# Patient Record
Sex: MALE | Race: BLACK OR AFRICAN AMERICAN | NOT HISPANIC OR LATINO | Employment: OTHER | URBAN - METROPOLITAN AREA
[De-identification: names, ages, dates, MRNs, and addresses within clinical notes are randomized per-mention and may not be internally consistent; named-entity substitution may affect disease eponyms.]

---

## 2018-04-09 ENCOUNTER — OFFICE VISIT (OUTPATIENT)
Dept: CARDIOLOGY | Facility: CLINIC | Age: 83
End: 2018-04-09
Attending: INTERNAL MEDICINE
Payer: MEDICARE

## 2018-04-09 VITALS
SYSTOLIC BLOOD PRESSURE: 132 MMHG | HEIGHT: 69 IN | DIASTOLIC BLOOD PRESSURE: 62 MMHG | HEART RATE: 66 BPM | BODY MASS INDEX: 25.48 KG/M2 | WEIGHT: 172 LBS

## 2018-04-09 DIAGNOSIS — I35.0 NONRHEUMATIC AORTIC VALVE STENOSIS: ICD-10-CM

## 2018-04-09 DIAGNOSIS — G47.33 OSA (OBSTRUCTIVE SLEEP APNEA): ICD-10-CM

## 2018-04-09 DIAGNOSIS — I25.10 CORONARY ARTERY DISEASE INVOLVING NATIVE CORONARY ARTERY OF NATIVE HEART WITHOUT ANGINA PECTORIS: ICD-10-CM

## 2018-04-09 DIAGNOSIS — E08.9 DIABETES MELLITUS DUE TO UNDERLYING CONDITION WITHOUT COMPLICATION, WITH LONG-TERM CURRENT USE OF INSULIN: ICD-10-CM

## 2018-04-09 DIAGNOSIS — I63.9 CEREBROVASCULAR ACCIDENT (CVA), UNSPECIFIED MECHANISM: Primary | ICD-10-CM

## 2018-04-09 DIAGNOSIS — Z79.4 DIABETES MELLITUS DUE TO UNDERLYING CONDITION WITHOUT COMPLICATION, WITH LONG-TERM CURRENT USE OF INSULIN: ICD-10-CM

## 2018-04-09 DIAGNOSIS — I10 ESSENTIAL HYPERTENSION: ICD-10-CM

## 2018-04-09 PROCEDURE — 99213 OFFICE O/P EST LOW 20 MIN: CPT | Mod: S$GLB,,, | Performed by: INTERNAL MEDICINE

## 2018-04-10 NOTE — PROGRESS NOTES
"Subjective:    Patient ID:  Clint Dukes is a 87 y.o. male     HPI  Here for F/U of CAD, CVA, AS, HBP, YAAKOV    Wife says that they went to the ER with a confusional state and were told that the Ammonia level was high and he was placed on Lactulose. He is now doing fairly well. Wheelchair bound, he sleeps a lot.    Current Outpatient Prescriptions   Medication Sig    ACCU-CHEK EFRAÍN PLUS TEST STRP Strp     aspirin (BUFFERIN) 325 MG Tab Take 325 mg by mouth.    aspirin 325 MG tablet Take 325 mg by mouth once daily.    atorvastatin (LIPITOR) 40 MG tablet Take 40 mg by mouth.    BD INSULIN PEN NEEDLE UF MINI 31 x 3/16 " Ndle     bisacodyl (DULCOLAX) 5 mg EC tablet Take 5 mg by mouth.    carvedilol (COREG) 6.25 MG tablet Take 6.25 mg by mouth 2 (two) times daily with meals.    ceramides 1,3,6-11 (CERAVE) Crea Apply topically.    clopidogrel (PLAVIX) 75 mg tablet Take 75 mg by mouth once daily.    dextromethorphan-guaifenesin  mg (MUCINEX DM)  mg per 12 hr tablet Take 1 tablet by mouth every 12 (twelve) hours.    diclofenac sodium 1 % Gel Apply 2 g topically once daily.    dorzolamide-timolol 2-0.5% (COSOPT) 2-0.5 % ophthalmic solution 1 drop 2 (two) times daily.    ergocalciferol (VITAMIN D2) 50,000 unit Cap Take 50,000 Units by mouth every 7 days.    insulin aspart (NOVOLOG) 100 unit/mL injection Inject 100 Units into the skin 3 (three) times daily before meals.    lactulose (CHRONULAC) 10 gram/15 mL solution Take 30 g by mouth 2 (two) times daily.    latanoprost 0.005 % ophthalmic solution 1 drop every evening.    metformin (GLUCOPHAGE) 500 MG tablet Take 500 mg by mouth 2 (two) times daily with meals.    multivitamin capsule Take 1 capsule by mouth once daily.    nifedipine (ADALAT CC) 30 MG TbSR Take 30 mg by mouth once daily.    omeprazole (PRILOSEC) 40 MG capsule Take 40 mg by mouth once daily.    PROPYLENE GLYCOL//PF (SYSTANE, PF, OPHT) Apply to eye.    rivastigmine " "tartrate (EXELON) 6 mg capsule Take 6 mg by mouth 2 (two) times daily.    theophylline (OBED-24) 100 MG 24 hr capsule Take 100 mg by mouth once daily.    timolol 0.25 % ophthalmic solution 1-2 drops.     No current facility-administered medications for this visit.          Review of Systems   Constitution: Negative for chills, decreased appetite, fever, weight gain and weight loss.   HENT: Negative for congestion, hearing loss and sore throat.    Eyes: Negative for blurred vision, double vision and visual disturbance.   Cardiovascular: Negative for chest pain, claudication, dyspnea on exertion, leg swelling, palpitations and syncope.   Respiratory: Negative for cough, hemoptysis, shortness of breath, sputum production and wheezing.    Endocrine: Negative for cold intolerance and heat intolerance.   Hematologic/Lymphatic: Negative for bleeding problem. Does not bruise/bleed easily.   Skin: Negative for color change, dry skin, flushing and itching.   Musculoskeletal: Negative for back pain, joint pain and myalgias.   Gastrointestinal: Negative for abdominal pain, anorexia, constipation, diarrhea, dysphagia, nausea and vomiting.        No bleeding per rectum   Genitourinary: Negative for dysuria, flank pain, frequency, hematuria and nocturia.   Neurological: Positive for difficulty with concentration and excessive daytime sleepiness. Negative for dizziness, headaches, light-headedness, loss of balance, seizures and tremors.   Psychiatric/Behavioral: Negative for altered mental status and depression.         Vitals:    04/09/18 1044   BP: 132/62   Pulse: 66   Weight: 78 kg (172 lb)   Height: 5' 9" (1.753 m)     Objective:    Physical Exam   Constitutional: He is oriented to person, place, and time. He appears well-developed and well-nourished.   HENT:   Head: Normocephalic and atraumatic.   Right Ear: External ear normal.   Left Ear: External ear normal.   Nose: Nose normal.   Eyes: Conjunctivae and EOM are normal. " Pupils are equal, round, and reactive to light. No scleral icterus.   Neck: Normal range of motion. Neck supple. No JVD present. No tracheal deviation present. No thyromegaly present.   Cardiovascular: Normal rate and regular rhythm.  Exam reveals no gallop and no friction rub.    Murmur heard.  Basal ejection systolic murmur conducted to the neck.   Pulmonary/Chest: Effort normal and breath sounds normal. No respiratory distress. He has no rales. He exhibits no tenderness.   Abdominal: Soft. Bowel sounds are normal. He exhibits no distension and no mass. There is no tenderness.   Musculoskeletal: Normal range of motion. He exhibits no edema or tenderness.   Lymphadenopathy:     He has no cervical adenopathy.   Neurological: He is alert and oriented to person, place, and time. He has normal reflexes. No cranial nerve deficit. Coordination normal.   Skin: Skin is warm and dry. No rash noted.   Psychiatric: He has a normal mood and affect. His behavior is normal.         Assessment:       1. Cerebrovascular accident (CVA), unspecified mechanism    2. Coronary artery disease involving native coronary artery of native heart without angina pectoris    3. Diabetes mellitus due to underlying condition without complication, with long-term current use of insulin    4. Nonrheumatic aortic valve stenosis    5. Essential hypertension    6. YAAKOV (obstructive sleep apnea)         Plan:       Cardiac wise stable  Continue the same

## 2018-08-27 ENCOUNTER — OFFICE VISIT (OUTPATIENT)
Dept: CARDIOLOGY | Facility: CLINIC | Age: 83
End: 2018-08-27
Attending: INTERNAL MEDICINE
Payer: MEDICARE

## 2018-08-27 VITALS
BODY MASS INDEX: 25.18 KG/M2 | HEART RATE: 63 BPM | WEIGHT: 170 LBS | SYSTOLIC BLOOD PRESSURE: 143 MMHG | HEIGHT: 69 IN | DIASTOLIC BLOOD PRESSURE: 62 MMHG

## 2018-08-27 DIAGNOSIS — I35.0 AORTIC VALVE STENOSIS, ETIOLOGY OF CARDIAC VALVE DISEASE UNSPECIFIED: Primary | ICD-10-CM

## 2018-08-27 DIAGNOSIS — E78.5 HYPERLIPIDEMIA, UNSPECIFIED HYPERLIPIDEMIA TYPE: ICD-10-CM

## 2018-08-27 DIAGNOSIS — I63.9 CEREBROVASCULAR ACCIDENT (CVA), UNSPECIFIED MECHANISM: ICD-10-CM

## 2018-08-27 DIAGNOSIS — E08.49 DIABETES MELLITUS DUE TO UNDERLYING CONDITION WITH OTHER NEUROLOGIC COMPLICATION, WITH LONG-TERM CURRENT USE OF INSULIN: ICD-10-CM

## 2018-08-27 DIAGNOSIS — I10 ESSENTIAL HYPERTENSION: ICD-10-CM

## 2018-08-27 DIAGNOSIS — I35.0 NONRHEUMATIC AORTIC VALVE STENOSIS: ICD-10-CM

## 2018-08-27 DIAGNOSIS — Z79.4 DIABETES MELLITUS DUE TO UNDERLYING CONDITION WITH OTHER NEUROLOGIC COMPLICATION, WITH LONG-TERM CURRENT USE OF INSULIN: ICD-10-CM

## 2018-08-27 DIAGNOSIS — E66.9 OBESITY WITH SERIOUS COMORBIDITY, UNSPECIFIED CLASSIFICATION, UNSPECIFIED OBESITY TYPE: ICD-10-CM

## 2018-08-27 DIAGNOSIS — I25.10 CORONARY ARTERY DISEASE INVOLVING NATIVE CORONARY ARTERY OF NATIVE HEART WITHOUT ANGINA PECTORIS: ICD-10-CM

## 2018-08-27 DIAGNOSIS — G47.33 OSA (OBSTRUCTIVE SLEEP APNEA): ICD-10-CM

## 2018-08-27 PROCEDURE — 99214 OFFICE O/P EST MOD 30 MIN: CPT | Mod: S$GLB,,, | Performed by: INTERNAL MEDICINE

## 2018-08-27 PROCEDURE — 93000 ELECTROCARDIOGRAM COMPLETE: CPT | Mod: S$GLB,,, | Performed by: INTERNAL MEDICINE

## 2018-08-27 NOTE — PROGRESS NOTES
"Subjective:    Patient ID:  Clint Dukes is a 87 y.o. male     HPI   Here for follow-up of cerebrovascular accident, coronary artery disease, aortic stenosis, hypertension, hyperlipidemia, obesity, diabetes mellitus, obstructive sleep apnea.    I am doing well.  I walk limited distances with my walker.  I want to know if I can discontinue my oxygen in the daytime.  I continue to use the CPAP at night.    Current Outpatient Medications   Medication Sig    ACCU-CHEK EFRAÍN PLUS TEST STRP Strp     aspirin (BUFFERIN) 325 MG Tab Take 325 mg by mouth.    aspirin 325 MG tablet Take 325 mg by mouth once daily.    atorvastatin (LIPITOR) 40 MG tablet Take 40 mg by mouth.    BD INSULIN PEN NEEDLE UF MINI 31 x 3/16 " Ndle     bisacodyl (DULCOLAX) 5 mg EC tablet Take 5 mg by mouth.    carvedilol (COREG) 6.25 MG tablet Take 6.25 mg by mouth 2 (two) times daily with meals.    ceramides 1,3,6-11 (CERAVE) Crea Apply topically.    clopidogrel (PLAVIX) 75 mg tablet Take 75 mg by mouth once daily.    dextromethorphan-guaifenesin  mg (MUCINEX DM)  mg per 12 hr tablet Take 1 tablet by mouth every 12 (twelve) hours.    diclofenac sodium 1 % Gel Apply 2 g topically once daily.    dorzolamide-timolol 2-0.5% (COSOPT) 2-0.5 % ophthalmic solution 1 drop 2 (two) times daily.    ergocalciferol (VITAMIN D2) 50,000 unit Cap Take 50,000 Units by mouth every 7 days.    insulin aspart (NOVOLOG) 100 unit/mL injection Inject 100 Units into the skin 3 (three) times daily before meals.    lactulose (CHRONULAC) 10 gram/15 mL solution Take 30 g by mouth 2 (two) times daily.    latanoprost 0.005 % ophthalmic solution 1 drop every evening.    metformin (GLUCOPHAGE) 500 MG tablet Take 500 mg by mouth 2 (two) times daily with meals.    multivitamin capsule Take 1 capsule by mouth once daily.    nifedipine (ADALAT CC) 30 MG TbSR Take 30 mg by mouth once daily.    omeprazole (PRILOSEC) 40 MG capsule Take 40 mg by mouth once daily. " "   PROPYLENE GLYCOL//PF (SYSTANE, PF, OPHT) Apply to eye.    rivastigmine tartrate (EXELON) 6 mg capsule Take 6 mg by mouth 2 (two) times daily.    theophylline (OBED-24) 100 MG 24 hr capsule Take 100 mg by mouth once daily.    timolol 0.25 % ophthalmic solution 1-2 drops.     No current facility-administered medications for this visit.          Review of Systems   Constitution: Negative for chills, decreased appetite, fever, weight gain and weight loss.   HENT: Negative for congestion, hearing loss and sore throat.    Eyes: Negative for blurred vision, double vision and visual disturbance.   Cardiovascular: Negative for chest pain, claudication, dyspnea on exertion, leg swelling, palpitations and syncope.   Respiratory: Negative for cough, hemoptysis, shortness of breath, sputum production and wheezing.    Endocrine: Negative for cold intolerance and heat intolerance.   Hematologic/Lymphatic: Negative for bleeding problem. Does not bruise/bleed easily.   Skin: Negative for color change, dry skin, flushing and itching.   Musculoskeletal: Negative for back pain, joint pain and myalgias.   Gastrointestinal: Negative for abdominal pain, anorexia, constipation, diarrhea, dysphagia, nausea and vomiting.        No bleeding per rectum   Genitourinary: Negative for dysuria, flank pain, frequency, hematuria and nocturia.   Neurological: Negative for dizziness, headaches, light-headedness, loss of balance, seizures and tremors.   Psychiatric/Behavioral: Negative for altered mental status and depression.         Vitals:    08/27/18 1317   BP: (!) 143/62   Pulse: 63   Weight: 77.1 kg (170 lb)   Height: 5' 9" (1.753 m)     Objective:    Physical Exam   Constitutional: He is oriented to person, place, and time. He appears well-developed and well-nourished.   HENT:   Head: Normocephalic and atraumatic.   Right Ear: External ear normal.   Left Ear: External ear normal.   Nose: Nose normal.   Eyes: Conjunctivae and EOM " are normal. Pupils are equal, round, and reactive to light. No scleral icterus.   Neck: Normal range of motion. Neck supple. No JVD present. No tracheal deviation present. No thyromegaly present.   Cardiovascular: Normal rate and regular rhythm. Exam reveals no gallop and no friction rub.   Murmur heard.  Basal ejection systolic murmur 3/6 conducted to the carotids.   Pulmonary/Chest: Effort normal and breath sounds normal. No respiratory distress. He has no rales. He exhibits no tenderness.   Abdominal: Soft. Bowel sounds are normal. He exhibits no distension and no mass. There is no tenderness.   Musculoskeletal: Normal range of motion. He exhibits no edema or tenderness.   Lymphadenopathy:     He has no cervical adenopathy.   Neurological: He is alert and oriented to person, place, and time. He has normal reflexes. No cranial nerve deficit. Coordination normal.   Skin: Skin is warm and dry. No rash noted.   Psychiatric: He has a normal mood and affect. His behavior is normal.         Assessment:           2. Cerebrovascular accident (CVA), unspecified mechanism    3. Coronary artery disease involving native coronary artery of native heart without angina pectoris    4. Nonrheumatic aortic valve stenosis    5. Essential hypertension    6. Hyperlipidemia, unspecified hyperlipidemia type    7. Obesity with serious comorbidity, unspecified classification, unspecified obesity type    8. 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 YAAKOV (obstructive sleep apnea)         Plan:       Wife to check pulse oximetry off the oxygen in the daytime.  Continue with CPAP at night.  Echocardiogram at next office visit.

## 2018-11-07 ENCOUNTER — OFFICE VISIT (OUTPATIENT)
Dept: CARDIOLOGY | Facility: CLINIC | Age: 83
End: 2018-11-07
Attending: INTERNAL MEDICINE
Payer: MEDICARE

## 2018-11-07 VITALS
HEART RATE: 72 BPM | DIASTOLIC BLOOD PRESSURE: 71 MMHG | WEIGHT: 170 LBS | HEIGHT: 69 IN | SYSTOLIC BLOOD PRESSURE: 166 MMHG | BODY MASS INDEX: 25.18 KG/M2

## 2018-11-07 DIAGNOSIS — I10 ESSENTIAL HYPERTENSION: ICD-10-CM

## 2018-11-07 DIAGNOSIS — E66.9 OBESITY WITH SERIOUS COMORBIDITY, UNSPECIFIED CLASSIFICATION, UNSPECIFIED OBESITY TYPE: ICD-10-CM

## 2018-11-07 DIAGNOSIS — E08.49 DIABETES MELLITUS DUE TO UNDERLYING CONDITION WITH OTHER NEUROLOGIC COMPLICATION, WITH LONG-TERM CURRENT USE OF INSULIN: ICD-10-CM

## 2018-11-07 DIAGNOSIS — I35.0 AORTIC VALVE STENOSIS, ETIOLOGY OF CARDIAC VALVE DISEASE UNSPECIFIED: Primary | ICD-10-CM

## 2018-11-07 DIAGNOSIS — I63.9 CEREBROVASCULAR ACCIDENT (CVA), UNSPECIFIED MECHANISM: ICD-10-CM

## 2018-11-07 DIAGNOSIS — E78.5 HYPERLIPIDEMIA, UNSPECIFIED HYPERLIPIDEMIA TYPE: ICD-10-CM

## 2018-11-07 DIAGNOSIS — I25.10 CORONARY ARTERY DISEASE INVOLVING NATIVE CORONARY ARTERY OF NATIVE HEART WITHOUT ANGINA PECTORIS: ICD-10-CM

## 2018-11-07 DIAGNOSIS — G47.33 OSA (OBSTRUCTIVE SLEEP APNEA): ICD-10-CM

## 2018-11-07 DIAGNOSIS — I35.0 NONRHEUMATIC AORTIC VALVE STENOSIS: ICD-10-CM

## 2018-11-07 DIAGNOSIS — Z79.4 DIABETES MELLITUS DUE TO UNDERLYING CONDITION WITH OTHER NEUROLOGIC COMPLICATION, WITH LONG-TERM CURRENT USE OF INSULIN: ICD-10-CM

## 2018-11-07 PROCEDURE — 99213 OFFICE O/P EST LOW 20 MIN: CPT | Mod: S$GLB,,, | Performed by: INTERNAL MEDICINE

## 2018-11-07 NOTE — PROGRESS NOTES
"Subjective:    Patient ID:  Clint Dukes is a 88 y.o. male     HPI   Here for follow-up of coronary artery disease, status post drug-eluting stent in the right coronary artery, and 2 stents in the left circumflex coronary artery in 2010, drug-eluting stent in the left anterior descending coronary artery in 2012, aortic stenosis, essential hypertension, diabetes mellitus, obesity, obstructive sleep apnea, cerebrovascular accident, hyperlipidemia.    Wife says that he was recently hospitalized at [facility] for urosepsis.  He was sent home on of antibiotics and is now doing quite well.  He still sleeps a lot, however uses his CPAP religiously at night.  He denies a history of breathlessness.  During his hospitalization at [facility], he apparently had an echocardiogram, and was told that his aortic valve stenosis had become a little worse.    Current Outpatient Medications   Medication Sig    ACCU-CHEK EFRAÍN PLUS TEST STRP Strp     aspirin (BUFFERIN) 325 MG Tab Take 325 mg by mouth.    aspirin 325 MG tablet Take 325 mg by mouth once daily.    atorvastatin (LIPITOR) 40 MG tablet Take 40 mg by mouth.    BD INSULIN PEN NEEDLE UF MINI 31 x 3/16 " Ndle     bisacodyl (DULCOLAX) 5 mg EC tablet Take 5 mg by mouth.    carvedilol (COREG) 6.25 MG tablet Take 6.25 mg by mouth 2 (two) times daily with meals.    ceramides 1,3,6-11 (CERAVE) Crea Apply topically.    clopidogrel (PLAVIX) 75 mg tablet Take 75 mg by mouth once daily.    dextromethorphan-guaifenesin  mg (MUCINEX DM)  mg per 12 hr tablet Take 1 tablet by mouth every 12 (twelve) hours.    diclofenac sodium 1 % Gel Apply 2 g topically once daily.    dorzolamide-timolol 2-0.5% (COSOPT) 2-0.5 % ophthalmic solution 1 drop 2 (two) times daily.    ergocalciferol (VITAMIN D2) 50,000 unit Cap Take 50,000 Units by mouth every 7 days.    insulin aspart (NOVOLOG) 100 unit/mL injection Inject 100 Units into the skin 3 (three) times daily before meals.    lactulose " (CHRONULAC) 10 gram/15 mL solution Take 30 g by mouth 2 (two) times daily.    latanoprost 0.005 % ophthalmic solution 1 drop every evening.    metformin (GLUCOPHAGE) 500 MG tablet Take 500 mg by mouth 2 (two) times daily with meals.    multivitamin capsule Take 1 capsule by mouth once daily.    nifedipine (ADALAT CC) 30 MG TbSR Take 30 mg by mouth once daily.    omeprazole (PRILOSEC) 40 MG capsule Take 40 mg by mouth once daily.    PROPYLENE GLYCOL//PF (SYSTANE, PF, OPHT) Apply to eye.    rivastigmine tartrate (EXELON) 6 mg capsule Take 6 mg by mouth 2 (two) times daily.    theophylline (OBED-24) 100 MG 24 hr capsule Take 100 mg by mouth once daily.    timolol 0.25 % ophthalmic solution 1-2 drops.     No current facility-administered medications for this visit.          Review of Systems   Constitution: Negative for chills, decreased appetite, fever, weight gain and weight loss.   HENT: Negative for congestion, hearing loss and sore throat.    Eyes: Negative for blurred vision, double vision and visual disturbance.   Cardiovascular: Negative for chest pain, claudication, dyspnea on exertion, leg swelling, palpitations and syncope.   Respiratory: Negative for cough, hemoptysis, shortness of breath, sputum production and wheezing.    Endocrine: Negative for cold intolerance and heat intolerance.   Hematologic/Lymphatic: Negative for bleeding problem. Does not bruise/bleed easily.   Skin: Negative for color change, dry skin, flushing and itching.   Musculoskeletal: Negative for back pain, joint pain and myalgias.   Gastrointestinal: Negative for abdominal pain, anorexia, constipation, diarrhea, dysphagia, nausea and vomiting.        No bleeding per rectum   Genitourinary: Negative for dysuria, flank pain, frequency, hematuria and nocturia.   Neurological: Negative for dizziness, headaches, light-headedness, loss of balance, seizures and tremors.   Psychiatric/Behavioral: Negative for altered mental  "status and depression.         Vitals:    11/07/18 1310   BP: (!) 166/71   Pulse: 72   Weight: 77.1 kg (170 lb)   Height: 5' 9" (1.753 m)    Recheck blood pressure 142/68    Objective:    Physical Exam   Constitutional: He is oriented to person, place, and time. He appears well-developed and well-nourished.   HENT:   Head: Normocephalic and atraumatic.   Right Ear: External ear normal.   Left Ear: External ear normal.   Nose: Nose normal.   Eyes: Conjunctivae and EOM are normal. Pupils are equal, round, and reactive to light. No scleral icterus.   Neck: Normal range of motion. Neck supple. No JVD present. No tracheal deviation present. No thyromegaly present.   Cardiovascular: Normal rate and regular rhythm. Exam reveals no gallop and no friction rub.   Murmur heard.  Basal ejection systolic murmur conducted to the carotids.  Apical pansystolic murmur.   Pulmonary/Chest: Effort normal and breath sounds normal. No respiratory distress. He has no rales. He exhibits no tenderness.   Abdominal: Soft. Bowel sounds are normal. He exhibits no distension and no mass. There is no tenderness.   Musculoskeletal: Normal range of motion. He exhibits edema. He exhibits no tenderness.   Lymphadenopathy:     He has no cervical adenopathy.   Neurological: He is alert and oriented to person, place, and time. He has normal reflexes. No cranial nerve deficit. Coordination normal.   Skin: Skin is warm and dry. No rash noted.         Assessment:           2. Coronary artery disease involving native coronary artery of native heart without angina pectoris    3. Nonrheumatic aortic valve stenosis    4. Essential hypertension    5. 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 Obesity with serious comorbidity, unspecified classification, unspecified obesity type    7. Cerebrovascular accident (CVA), unspecified mechanism    8. Hyperlipidemia, unspecified " hyperlipidemia type    9. YAAKOV (obstructive sleep apnea)         Plan:       Will review the echocardiogram that was done at [facility].  Continue present medications.